# Patient Record
Sex: FEMALE | Race: AMERICAN INDIAN OR ALASKA NATIVE | URBAN - METROPOLITAN AREA
[De-identification: names, ages, dates, MRNs, and addresses within clinical notes are randomized per-mention and may not be internally consistent; named-entity substitution may affect disease eponyms.]

---

## 2021-07-02 ENCOUNTER — OFFICE VISIT (OUTPATIENT)
Dept: URBAN - METROPOLITAN AREA CLINIC 65 | Facility: CLINIC | Age: 61
End: 2021-07-02
Payer: COMMERCIAL

## 2021-07-02 DIAGNOSIS — H25.13 AGE-RELATED NUCLEAR CATARACT, BILATERAL: ICD-10-CM

## 2021-07-02 DIAGNOSIS — H40.9 GLAUCOMA: ICD-10-CM

## 2021-07-02 DIAGNOSIS — E11.3541 TYPE 2 DIAB WITH PROLIF DIAB RTNOP WITH COMB DETACH, R EYE: ICD-10-CM

## 2021-07-02 PROCEDURE — 67028 INJECTION EYE DRUG: CPT | Performed by: OPHTHALMOLOGY

## 2021-07-02 PROCEDURE — 67145 PROPH RTA DTCHMNT PC: CPT | Performed by: OPHTHALMOLOGY

## 2021-07-02 PROCEDURE — 92134 CPTRZ OPH DX IMG PST SGM RTA: CPT | Performed by: OPHTHALMOLOGY

## 2021-07-02 PROCEDURE — 99205 OFFICE O/P NEW HI 60 MIN: CPT | Performed by: OPHTHALMOLOGY

## 2021-07-02 ASSESSMENT — INTRAOCULAR PRESSURE
OS: 20
OD: 17

## 2021-07-02 NOTE — IMPRESSION/PLAN
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 Plan: Exam demonstrates a superonasal TRD/RRD OD with inferior VH. Recommend urgent laser barricade OD, then recommend surgery OD with pre-op Avastin. R/B/A discussed and patient elects to proceed. Laser retinopexy performed today OD without complication. RTC 23gPPV/MP/EL/poss SO x TRD/RRD OD, will require pre-op Avastin 3-5 days before surgery.

## 2021-07-02 NOTE — IMPRESSION/PLAN
Impression: Age-related nuclear cataract, bilateral: H25.13. Plan: Patient notes glare. Exam demonstrates a mild cataract. Discussed R/B/A of surgery vs observation. Recommend observation. Warning symptoms discussed. Discussed progression after PPV OD.

## 2021-07-02 NOTE — IMPRESSION/PLAN
Impression: Type 2 diab with prolif diab rtnop with macular edema, bi: [id number]. Plan: Exam and OCT reveal PDR with tr DME OU and NVE OU and TRD OD. Currently, there is no NVA/NVG identified, and the IOP is within normal limits. The diagnosis, natural history, and prognosis of PDR were discussed with the patient. The need for a combination of anti-VEGF and laser treatment to treat both the DME and the NV. The patient understands that treatment may not improve vision, but is intended to reduce the likelihood of severe vision loss. Recommend intravitreal Avastin today OS. R/B/A discussed and patient elects to proceed. Discussed that PRP and focal laser will likely be required in the future. Intravitreal Avastin was injected OS in the clinic without complication. See plan for PDR with TRD/RRD for plan OD. The patient was also urged to work closely with their PCP to avoid systemic complications of diabetic disease.

## 2021-07-02 NOTE — IMPRESSION/PLAN
Impression: Glaucoma: H40.9. Plan: Exam demonstrates glaucomatous cupping. Fortunately, there is no NVI today. IOP is WNL and there is no progression of ON cupping. Discussed surgery vs laser vs gtts vs observation.   Recommend CPM (followed by Dr. Stella Garcia)

## 2021-07-30 ENCOUNTER — PROCEDURE (OUTPATIENT)
Dept: URBAN - METROPOLITAN AREA CLINIC 65 | Facility: CLINIC | Age: 61
End: 2021-07-30
Payer: COMMERCIAL

## 2021-07-30 DIAGNOSIS — E11.3513 TYPE 2 DIABETES MELLITUS WITH PROLIFERATIVE DIABETIC RETINOPATHY WITH MACULAR EDEMA, BILATERAL: Primary | ICD-10-CM

## 2021-07-30 PROCEDURE — 92134 CPTRZ OPH DX IMG PST SGM RTA: CPT | Performed by: OPHTHALMOLOGY

## 2021-07-30 PROCEDURE — 67028 INJECTION EYE DRUG: CPT | Performed by: OPHTHALMOLOGY

## 2021-07-30 ASSESSMENT — INTRAOCULAR PRESSURE
OD: 17
OS: 22

## 2021-08-02 ENCOUNTER — POST-OPERATIVE VISIT (OUTPATIENT)
Dept: URBAN - METROPOLITAN AREA CLINIC 13 | Facility: CLINIC | Age: 61
End: 2021-08-02
Payer: COMMERCIAL

## 2021-08-02 ENCOUNTER — Encounter (OUTPATIENT)
Dept: URBAN - METROPOLITAN AREA EXTERNAL CLINIC 14 | Facility: EXTERNAL CLINIC | Age: 61
End: 2021-08-02
Payer: COMMERCIAL

## 2021-08-02 PROCEDURE — 99024 POSTOP FOLLOW-UP VISIT: CPT | Performed by: OPHTHALMOLOGY

## 2021-08-02 PROCEDURE — 67113 REPAIR RETINAL DETACH CPLX: CPT | Performed by: OPHTHALMOLOGY

## 2021-08-02 ASSESSMENT — INTRAOCULAR PRESSURE
OS: 16
OD: 19
OS: 16
OD: 19

## 2021-08-02 NOTE — IMPRESSION/PLAN
Impression: S/P 23ga PPV MP IL POSS SO x PDR/TRD/RRD OD - . 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 Excellent post op course   Post operative instructions reviewed - Condition is improving - Plan: No s/s of RD/infection VA/IOP acceptable Has full motility and vision, even though same day post-op. Has excellent blink reflex. Done with patching. Post-operative instructions and precautions Reviewed. Call ASAP with changes
--Continue Ocuflox QID--Continue PF QID
cont pre-op brimonidine 1 week POS (SI)

## 2022-11-08 ENCOUNTER — OFFICE VISIT (OUTPATIENT)
Dept: URBAN - METROPOLITAN AREA CLINIC 13 | Facility: CLINIC | Age: 62
End: 2022-11-08
Payer: COMMERCIAL

## 2022-11-08 DIAGNOSIS — H25.13 AGE-RELATED NUCLEAR CATARACT, BILATERAL: ICD-10-CM

## 2022-11-08 DIAGNOSIS — E11.3513 TYPE 2 DIAB WITH PROLIF DIAB RTNOP WITH MACULAR EDEMA, BI: ICD-10-CM

## 2022-11-08 DIAGNOSIS — E11.3541 TYPE 2 DIAB WITH PROLIF DIAB RTNOP WITH COMB DETACH, R EYE: Primary | ICD-10-CM

## 2022-11-08 DIAGNOSIS — H43.12 VITREOUS HEMORRHAGE, LEFT EYE: ICD-10-CM

## 2022-11-08 PROCEDURE — 92134 CPTRZ OPH DX IMG PST SGM RTA: CPT | Performed by: OPHTHALMOLOGY

## 2022-11-08 PROCEDURE — 99214 OFFICE O/P EST MOD 30 MIN: CPT | Performed by: OPHTHALMOLOGY

## 2022-11-08 ASSESSMENT — INTRAOCULAR PRESSURE
OS: 13
OD: 14

## 2022-11-08 NOTE — IMPRESSION/PLAN
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 S/P 23G PPV/MP/EL OD - 8/2021 (SI) Plan: Looks great;  vision worse due to cataract progression OD. Rec eval for cataract surgery.

## 2022-11-08 NOTE — IMPRESSION/PLAN
Impression: Age-related nuclear cataract, bilateral: H25.13. Plan: Progression OD after PPV. Will require cataract eval after OS is stabilized.

## 2022-11-08 NOTE — IMPRESSION/PLAN
Impression: Type 2 diab with prolif diab rtnop with macular edema, bi: [phone].  Plan: See plan for [facility] OS

## 2022-11-08 NOTE — IMPRESSION/PLAN
Impression: Vitreous hemorrhage, left eye: H43.12. Plan: Exam confirms vitreous hemorrhage in the left eye, secondary to PDR. Discussed R/B/A's of observation vs. PPV vs. Anti-VEGF injection. Recommend AntiVEGF injection and surgery due to extent and chronicity of VH. R/B/A discussed and patient elects to proceed. 


RTC: 25gPPV/MP/EL/Avastin x VH/PDR/poss TRD OS, needs pre op Avastin appt within 1 wk of surgery: OCT OU, Optos colors, Optos FA OS>OD, straight Avastin #1/1

## 2022-11-28 ENCOUNTER — OFFICE VISIT (OUTPATIENT)
Dept: URBAN - METROPOLITAN AREA CLINIC 36 | Facility: CLINIC | Age: 62
End: 2022-11-28
Payer: COMMERCIAL

## 2022-11-28 DIAGNOSIS — E11.3513 TYPE 2 DIABETES MELLITUS WITH PROLIFERATIVE DIABETIC RETINOPATHY WITH MACULAR EDEMA, BILATERAL: Primary | ICD-10-CM

## 2022-11-28 DIAGNOSIS — H40.9 GLAUCOMA: ICD-10-CM

## 2022-11-28 DIAGNOSIS — H43.12 VITREOUS HEMORRHAGE, LEFT EYE: ICD-10-CM

## 2022-11-28 PROCEDURE — 92134 CPTRZ OPH DX IMG PST SGM RTA: CPT | Performed by: OPHTHALMOLOGY

## 2022-11-28 PROCEDURE — 99214 OFFICE O/P EST MOD 30 MIN: CPT | Performed by: OPHTHALMOLOGY

## 2022-11-28 RX ORDER — PREDNISOLONE ACETATE 10 MG/ML
1 % SUSPENSION/ DROPS OPHTHALMIC
Qty: 5 | Refills: 2 | Status: ACTIVE
Start: 2022-11-28

## 2022-11-28 RX ORDER — OFLOXACIN 3 MG/ML
0.3 % SOLUTION/ DROPS OPHTHALMIC
Qty: 5 | Refills: 0 | Status: ACTIVE
Start: 2022-11-28

## 2022-11-28 ASSESSMENT — INTRAOCULAR PRESSURE
OS: 19
OD: 18

## 2022-11-28 NOTE — IMPRESSION/PLAN
Impression: Vitreous hemorrhage, left eye: H43.12. Plan: Patient notes new vision loss. Exam confirms repeat vitreous hemorrhage in the left eye, secondary to PDR. Discussed R/B/A's of observation vs. PPV vs. Anti-VEGF injection. Recommend AntiVEGF injection and surgery due to extent and chronicity of VH. R/B/A discussed and patient elects to proceed. Intravitreal Avastin injected OU today. 

RTC: 25gPPV/MP/EL/Avastin x VH/PDR/poss TRD OS

## 2022-11-28 NOTE — IMPRESSION/PLAN
Impression: Glaucoma: H40.9. Plan: Exam demonstrates glaucomatous cupping. Fortunately, there is no NVI today. IOP is WNL and there is no progression of ON cupping. Discussed surgery vs laser vs gtts vs observation.   Recommend CPM (followed by Dr. Joel Boyce)

## 2022-11-28 NOTE — IMPRESSION/PLAN
Impression: Type 2 diab with prolif diab rtnop with macular edema, bi: [id number].  Plan: See plan for [facility] OS

## 2022-12-06 ENCOUNTER — POST-OPERATIVE VISIT (OUTPATIENT)
Dept: URBAN - METROPOLITAN AREA EXTERNAL CLINIC 14 | Facility: EXTERNAL CLINIC | Age: 62
End: 2022-12-06
Payer: COMMERCIAL

## 2022-12-06 DIAGNOSIS — H43.12 VITREOUS HEMORRHAGE, LEFT EYE: Primary | ICD-10-CM

## 2022-12-06 PROCEDURE — 99024 POSTOP FOLLOW-UP VISIT: CPT | Performed by: OPHTHALMOLOGY

## 2022-12-06 ASSESSMENT — INTRAOCULAR PRESSURE
OD: 16
OS: 29

## 2022-12-06 NOTE — IMPRESSION/PLAN
Impression: S/P 25G PPV/MP/EL/AVASTIN OS - . Vitreous hemorrhage, left eye  H43.12.  Plan: --Excellent post op course   
--Post operative instructions reviewed 
--Condition is improving 
--No s/s of infection
-- IOP normal 

RTC: 1 wk POS OS

## 2022-12-16 ENCOUNTER — POST-OPERATIVE VISIT (OUTPATIENT)
Dept: URBAN - METROPOLITAN AREA CLINIC 13 | Facility: CLINIC | Age: 62
End: 2022-12-16
Payer: COMMERCIAL

## 2022-12-16 DIAGNOSIS — H43.12 VITREOUS HEMORRHAGE, LEFT EYE: Primary | ICD-10-CM

## 2022-12-16 PROCEDURE — 99024 POSTOP FOLLOW-UP VISIT: CPT | Performed by: OPHTHALMOLOGY

## 2022-12-16 ASSESSMENT — INTRAOCULAR PRESSURE
OS: 15
OD: 15

## 2022-12-16 NOTE — IMPRESSION/PLAN
Impression: S/P S/P 25G PPV/MP/EL/AVASTIN OS OS - 10 Days. Vitreous hemorrhage, left eye  H43.12. Plan: No s/s of RD/infection VA/IOP acceptable Post-operative instructions and precautions Reviewed. Call ASAP with changes --Taper Prednisolone acetate 1% TID x 1 wk, BID x 1wk, QD x 1wk, then d/c
--Discontinue Ocuflox 1 month POS/OCT (SI)

## 2022-12-27 ENCOUNTER — POST-OPERATIVE VISIT (OUTPATIENT)
Dept: URBAN - METROPOLITAN AREA CLINIC 13 | Facility: CLINIC | Age: 62
End: 2022-12-27
Payer: COMMERCIAL

## 2022-12-27 DIAGNOSIS — H43.12 VITREOUS HEMORRHAGE, LEFT EYE: Primary | ICD-10-CM

## 2022-12-27 PROCEDURE — 99024 POSTOP FOLLOW-UP VISIT: CPT | Performed by: OPHTHALMOLOGY

## 2022-12-27 ASSESSMENT — INTRAOCULAR PRESSURE
OS: 14
OD: 15

## 2022-12-27 NOTE — IMPRESSION/PLAN
Impression: S/P 25G PPV/MP/EL/AVASTIN OS - 21 Days. Vitreous hemorrhage, left eye  H43.12. Plan: --Excellent post op course   
--Post operative instructions reviewed 
--Condition is improving RTC: 2-3 mo OCT OU with possible AVASTIN OU

## 2023-03-29 ENCOUNTER — OFFICE VISIT (OUTPATIENT)
Facility: LOCATION | Age: 63
End: 2023-03-29
Payer: COMMERCIAL

## 2023-03-29 DIAGNOSIS — H40.9 GLAUCOMA: ICD-10-CM

## 2023-03-29 DIAGNOSIS — H43.12 VITREOUS HEMORRHAGE, LEFT EYE: ICD-10-CM

## 2023-03-29 DIAGNOSIS — H25.13 AGE-RELATED NUCLEAR CATARACT, BILATERAL: ICD-10-CM

## 2023-03-29 DIAGNOSIS — E11.3541 TYPE 2 DIAB WITH PROLIF DIAB RTNOP WITH COMB DETACH, R EYE: ICD-10-CM

## 2023-03-29 DIAGNOSIS — E11.3513 TYPE 2 DIAB WITH PROLIF DIAB RTNOP WITH MACULAR EDEMA, BI: Primary | ICD-10-CM

## 2023-03-29 PROCEDURE — 92012 INTRM OPH EXAM EST PATIENT: CPT | Performed by: OPHTHALMOLOGY

## 2023-03-29 PROCEDURE — 92134 CPTRZ OPH DX IMG PST SGM RTA: CPT | Performed by: OPHTHALMOLOGY

## 2023-03-29 ASSESSMENT — INTRAOCULAR PRESSURE
OS: 17
OD: 18

## 2023-03-29 NOTE — IMPRESSION/PLAN
Impression: Age-related nuclear cataract, bilateral: H25.13. Plan: Progression OD after PPV. Patient cleared to proceed with cataract surgery from retina perspective. Rec follow up with [facility].

## 2023-03-29 NOTE — IMPRESSION/PLAN
Impression: Vitreous hemorrhage, left eye  H43.12.
-s/p 25G PPV/MP/EL/AVASTIN (12/06/2022)-SI Plan: Exam and OCT show no recurrence of VH.  Observe

## 2023-03-29 NOTE — IMPRESSION/PLAN
Impression: Type 2 diab with prolif diab rtnop with macular edema, bi: [id number]. Plan: Exam and OCT demonstrate tr DME. Rec Avastin OU today in preparation for cataract surgery. Intravitreal Avastin injected OU today without complication.  

RTC 3-4 mo with OCT OU, poss Avastin  vs observation

## 2023-03-29 NOTE — IMPRESSION/PLAN
Impression: Glaucoma: H40.9. Plan: Exam demonstrates glaucomatous cupping. Fortunately, there is no NVI today. IOP is WNL and there is no progression of ON cupping. Discussed surgery vs laser vs gtts vs observation.   Recommend CPM (followed by Dr. Dyan Rosa)

## 2023-04-28 ENCOUNTER — OFFICE VISIT (OUTPATIENT)
Dept: URBAN - METROPOLITAN AREA CLINIC 64 | Facility: CLINIC | Age: 63
End: 2023-04-28
Payer: COMMERCIAL

## 2023-04-28 DIAGNOSIS — E11.3593 DIABETES MELLITUS TYPE 2 WITH PROLIFERATIVE RETINOPATHY WITHOUT MACULAR EDEMA, BILATERAL: ICD-10-CM

## 2023-04-28 DIAGNOSIS — H40.1131 PRIMARY OPEN-ANGLE GLAUCOMA, MILD STAGE, BILATERAL: ICD-10-CM

## 2023-04-28 DIAGNOSIS — H25.13 AGE-RELATED NUCLEAR CATARACT, BILATERAL: Primary | ICD-10-CM

## 2023-04-28 PROCEDURE — 99204 OFFICE O/P NEW MOD 45 MIN: CPT | Performed by: STUDENT IN AN ORGANIZED HEALTH CARE EDUCATION/TRAINING PROGRAM

## 2023-04-28 PROCEDURE — 92136 OPHTHALMIC BIOMETRY: CPT | Performed by: STUDENT IN AN ORGANIZED HEALTH CARE EDUCATION/TRAINING PROGRAM

## 2023-04-28 ASSESSMENT — VISUAL ACUITY
OD: 20/150
OS: 20/40

## 2023-04-28 ASSESSMENT — INTRAOCULAR PRESSURE
OD: 9
OS: 8

## 2023-04-28 NOTE — IMPRESSION/PLAN
Impression: Age-related nuclear cataract, bilateral: H25.13. Plan: Discussed cataracts, treatment options, and surgical risks/benefits with patient including bleeding, infection, capsular break, glaucoma, corneal clouding. Patient understands there are tradeoffs to each intraocular lens choice and glasses may still be necessary after surgery. Pt understands that multifocal intraocular lenses have side effects including but not limited to halos/glare/difficulty in dim lighting and with intermediate vision. The patient is bothered by the symptoms of their cataract which is not correctable with a change in glasses and their ADL's are impaired. Patient elects surgical treatment and wishes to proceed. There is reasonable expectation that both the patient's visual and functional status will improve after surgery. Post op care to be patient's choice of provider/clinic and may include comanagement with a local or distant optometrist/ophthalmologist. Recommend ORA. Recommend Dextenza (pending insurance authorization) + Moxifloxacin (PF) Intracameral Injection Lens Recommendation: [employer]: OK for Elle Samuel Aim OD: -0.25. Aim OS: -0.25. [facility]: RIGHT Notes:

## 2023-04-28 NOTE — IMPRESSION/PLAN
Impression: Primary open-angle glaucoma, mild stage, bilateral: H40.1131.  Plan: Cupping of optic nerve OU currently on 1 drop Brimonidine and IOP stable
-Recommend istent OU at time of CEIOL for better control.
-Discussed all risks with patient including bleeding

## 2023-04-28 NOTE — IMPRESSION/PLAN
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  Plan: s/p Avastin OU, s/p PPV OU with PRP
-stable without recurrence today
-Follow up with DR Reyna Salmon as scheduled 
-Cataract progressed after PPV OU

## 2023-05-24 ENCOUNTER — ADULT PHYSICAL (OUTPATIENT)
Dept: URBAN - METROPOLITAN AREA CLINIC 64 | Facility: CLINIC | Age: 63
End: 2023-05-24
Payer: COMMERCIAL

## 2023-05-24 DIAGNOSIS — H25.13 AGE-RELATED NUCLEAR CATARACT, BILATERAL: ICD-10-CM

## 2023-05-24 DIAGNOSIS — Z01.818 ENCOUNTER FOR OTHER PREPROCEDURAL EXAMINATION: Primary | ICD-10-CM

## 2023-05-24 PROCEDURE — 99203 OFFICE O/P NEW LOW 30 MIN: CPT | Performed by: PHYSICIAN ASSISTANT

## 2023-06-07 ENCOUNTER — SURGERY (OUTPATIENT)
Dept: URBAN - METROPOLITAN AREA SURGERY 42 | Facility: SURGERY | Age: 63
End: 2023-06-07
Payer: COMMERCIAL

## 2023-06-07 DIAGNOSIS — H25.13 AGE-RELATED NUCLEAR CATARACT, BILATERAL: Primary | ICD-10-CM

## 2023-06-07 PROCEDURE — PR1CP PR1CP: CUSTOM | Performed by: STUDENT IN AN ORGANIZED HEALTH CARE EDUCATION/TRAINING PROGRAM

## 2023-06-07 RX ORDER — KETOROLAC TROMETHAMINE 5 MG/ML
0.5 % SOLUTION OPHTHALMIC
Qty: 1 | Refills: 1 | Status: INACTIVE
Start: 2023-06-07 | End: 2023-06-21

## 2023-06-21 ENCOUNTER — SURGERY (OUTPATIENT)
Dept: URBAN - METROPOLITAN AREA SURGERY 42 | Facility: SURGERY | Age: 63
End: 2023-06-21
Payer: COMMERCIAL

## 2023-06-21 DIAGNOSIS — H25.812 COMBINED FORMS OF AGE-RELATED CATARACT, LEFT EYE: Primary | ICD-10-CM

## 2023-06-21 PROCEDURE — PR1CP PR1CP: CUSTOM | Performed by: STUDENT IN AN ORGANIZED HEALTH CARE EDUCATION/TRAINING PROGRAM

## 2023-07-17 ENCOUNTER — OFFICE VISIT (OUTPATIENT)
Dept: URBAN - METROPOLITAN AREA CLINIC 64 | Facility: LOCATION | Age: 63
End: 2023-07-17
Payer: COMMERCIAL

## 2023-07-17 DIAGNOSIS — E11.3593 DIABETES MELLITUS TYPE 2 WITH PROLIFERATIVE RETINOPATHY WITHOUT MACULAR EDEMA, BILATERAL: ICD-10-CM

## 2023-07-17 DIAGNOSIS — H59.031 CYSTOID MACULAR EDEMA FOLLOWING CATARACT SURGERY, RIGHT EYE: Primary | ICD-10-CM

## 2023-07-17 PROCEDURE — 92250 FUNDUS PHOTOGRAPHY W/I&R: CPT | Performed by: OPHTHALMOLOGY

## 2023-07-17 PROCEDURE — 99214 OFFICE O/P EST MOD 30 MIN: CPT | Performed by: OPHTHALMOLOGY

## 2023-07-17 PROCEDURE — 92134 CPTRZ OPH DX IMG PST SGM RTA: CPT | Performed by: OPHTHALMOLOGY

## 2023-07-17 NOTE — IMPRESSION/PLAN
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 Plan: s/p PPV OU, observe.

## 2023-07-17 NOTE — IMPRESSION/PLAN
Impression: Cystoid macular edema following cataract surgery, right eye: H59.031. Plan: s/p cataract surgery 3 weeks (approx) ago w/ post-surgical CME, RTC 2 weeks XIPERE (needs auth) OD; risks of elevated IOP discussed. Should CME fail to resolve after XIPERE, consider FA to ensure no DR-related.